# Patient Record
Sex: MALE | Race: WHITE | NOT HISPANIC OR LATINO
[De-identification: names, ages, dates, MRNs, and addresses within clinical notes are randomized per-mention and may not be internally consistent; named-entity substitution may affect disease eponyms.]

---
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
## 2025-03-19 ENCOUNTER — APPOINTMENT (OUTPATIENT)
Dept: URBAN - METROPOLITAN AREA CLINIC 209 | Age: 20
Setting detail: DERMATOLOGY
End: 2025-03-30

## 2025-03-19 DIAGNOSIS — L70.0 ACNE VULGARIS: ICD-10-CM

## 2025-03-19 PROCEDURE — OTHER PRESCRIPTION: OTHER

## 2025-03-19 PROCEDURE — OTHER COUNSELING: OTHER

## 2025-03-19 PROCEDURE — OTHER PRESCRIPTION MEDICATION MANAGEMENT: OTHER

## 2025-03-19 PROCEDURE — OTHER MIPS QUALITY: OTHER

## 2025-03-19 PROCEDURE — 99214 OFFICE O/P EST MOD 30 MIN: CPT

## 2025-03-19 RX ORDER — CLINDAMYCIN PHOSPHATE 10 MG/ML
SOLUTION TOPICAL QAM
Qty: 60 | Refills: 3 | Status: ERX

## 2025-03-19 RX ORDER — TRETIONIN 0.5 MG/G
CREAM TOPICAL
Qty: 45 | Refills: 3 | Status: ERX | COMMUNITY
Start: 2025-03-19

## 2025-03-19 ASSESSMENT — LOCATION DETAILED DESCRIPTION DERM
LOCATION DETAILED: LEFT SUPERIOR UPPER BACK
LOCATION DETAILED: SUBMENTAL CHIN
LOCATION DETAILED: LEFT LOWER CUTANEOUS LIP
LOCATION DETAILED: RIGHT FOREHEAD
LOCATION DETAILED: LEFT CENTRAL TEMPLE
LOCATION DETAILED: LEFT FOREHEAD
LOCATION DETAILED: RIGHT SUPERIOR UPPER BACK
LOCATION DETAILED: RIGHT CHIN
LOCATION DETAILED: RIGHT CENTRAL TEMPLE
LOCATION DETAILED: LEFT CHIN
LOCATION DETAILED: RIGHT LOWER CUTANEOUS LIP
LOCATION DETAILED: LEFT INFERIOR MEDIAL BUCCAL CHEEK

## 2025-03-19 ASSESSMENT — LOCATION SIMPLE DESCRIPTION DERM
LOCATION SIMPLE: RIGHT TEMPLE
LOCATION SIMPLE: LEFT CHEEK
LOCATION SIMPLE: RIGHT UPPER BACK
LOCATION SIMPLE: RIGHT LIP
LOCATION SIMPLE: LEFT UPPER BACK
LOCATION SIMPLE: RIGHT FOREHEAD
LOCATION SIMPLE: LEFT FOREHEAD
LOCATION SIMPLE: LEFT TEMPLE
LOCATION SIMPLE: CHIN
LOCATION SIMPLE: SUBMENTAL CHIN
LOCATION SIMPLE: LEFT LIP

## 2025-03-19 ASSESSMENT — LOCATION ZONE DERM
LOCATION ZONE: TRUNK
LOCATION ZONE: LIP
LOCATION ZONE: FACE

## 2025-03-19 NOTE — PROCEDURE: PRESCRIPTION MEDICATION MANAGEMENT
Samples Given: .\\nCeraVe hydrating mineral sunscreen SPF 30\\nCeraVe hydrating sheer sunscreen SPF 30\\nCeraVe AM facial moisturizing lotion SPF 50\\nLa Roche Posay anthelios light fluid sunscreen mineral SPF 50\\nLa Roche Posay anthelios clear skin sunscreen SPF 60
Modify Regimen: .\\nIncrease from Tretinoin 0.025% cream to Tretinoin 0.05% cream.\\nAlternate 0.025% cream with 0.05% cream for 2-3 weeks to better tolerate the increase
Plan: .\\nAdvise patient he may treat back with same regimen. However, start with Tretinoin 0.025% cream until he runs out, then increase to 0.05% cream.\\n\\nEncourage him to increase use of treatment and become more consistent with use for most effective results.\\n\\nDiscussed with patient that while he does have some inflammatory acne, it is minimal and no indication for oral antibiotics at this time. Patient states he does not want to take oral antibiotics.\\n\\nStressed importance of sun protection and frequent reapplication of sunscreen with sun exposure \\nRecommend [email or web address] for summer job (ranch) in [place].
Continue Regimen: .\\nQAM:\\nCerave 4% benzoyl peroxide cleanser \\nClindamycin 1% pads - apply to affected areas\\nMoisturizer \\nSPF 30+\\n\\nQHS:\\nGentle cleanser \\nTretinoin cream - apply a thin layer to affected areas\\nMoisturizer
Render In Strict Bullet Format?: No
Detail Level: Zone